# Patient Record
Sex: FEMALE | Race: WHITE | NOT HISPANIC OR LATINO
[De-identification: names, ages, dates, MRNs, and addresses within clinical notes are randomized per-mention and may not be internally consistent; named-entity substitution may affect disease eponyms.]

---

## 2018-08-05 ENCOUNTER — TRANSCRIPTION ENCOUNTER (OUTPATIENT)
Age: 68
End: 2018-08-05

## 2018-08-06 ENCOUNTER — OUTPATIENT (OUTPATIENT)
Dept: OUTPATIENT SERVICES | Facility: HOSPITAL | Age: 68
LOS: 1 days | Discharge: HOME | End: 2018-08-06

## 2018-08-06 VITALS
OXYGEN SATURATION: 99 % | WEIGHT: 171.96 LBS | HEART RATE: 81 BPM | DIASTOLIC BLOOD PRESSURE: 78 MMHG | TEMPERATURE: 100 F | RESPIRATION RATE: 16 BRPM | HEIGHT: 63.25 IN | SYSTOLIC BLOOD PRESSURE: 160 MMHG

## 2018-08-06 VITALS
TEMPERATURE: 98 F | RESPIRATION RATE: 18 BRPM | SYSTOLIC BLOOD PRESSURE: 119 MMHG | HEART RATE: 75 BPM | OXYGEN SATURATION: 97 % | DIASTOLIC BLOOD PRESSURE: 65 MMHG

## 2018-08-06 DIAGNOSIS — T14.8XXA OTHER INJURY OF UNSPECIFIED BODY REGION, INITIAL ENCOUNTER: ICD-10-CM

## 2018-08-06 DIAGNOSIS — I10 ESSENTIAL (PRIMARY) HYPERTENSION: ICD-10-CM

## 2018-08-06 DIAGNOSIS — S62.645A NONDISPLACED FRACTURE OF PROXIMAL PHALANX OF LEFT RING FINGER, INITIAL ENCOUNTER FOR CLOSED FRACTURE: ICD-10-CM

## 2018-08-06 LAB
BUN SERPL-MCNC: 18 MG/DL — SIGNIFICANT CHANGE UP (ref 7–23)
CALCIUM SERPL-MCNC: 9.6 MG/DL — SIGNIFICANT CHANGE UP (ref 8.4–10.5)
CHLORIDE SERPL-SCNC: 104 MMOL/L — SIGNIFICANT CHANGE UP (ref 98–107)
CO2 SERPL-SCNC: 24 MMOL/L — SIGNIFICANT CHANGE UP (ref 22–31)
CREAT SERPL-MCNC: 0.8 MG/DL — SIGNIFICANT CHANGE UP (ref 0.5–1.3)
GLUCOSE SERPL-MCNC: 127 MG/DL — HIGH (ref 70–99)
HCT VFR BLD CALC: 44.9 % — SIGNIFICANT CHANGE UP (ref 34.5–45)
HGB BLD-MCNC: 14.8 G/DL — SIGNIFICANT CHANGE UP (ref 11.5–15.5)
MCHC RBC-ENTMCNC: 30 PG — SIGNIFICANT CHANGE UP (ref 27–34)
MCHC RBC-ENTMCNC: 33 % — SIGNIFICANT CHANGE UP (ref 32–36)
MCV RBC AUTO: 90.9 FL — SIGNIFICANT CHANGE UP (ref 80–100)
NRBC # FLD: 0 — SIGNIFICANT CHANGE UP
PLATELET # BLD AUTO: 244 K/UL — SIGNIFICANT CHANGE UP (ref 150–400)
PMV BLD: 9.7 FL — SIGNIFICANT CHANGE UP (ref 7–13)
POTASSIUM SERPL-MCNC: 4.9 MMOL/L — SIGNIFICANT CHANGE UP (ref 3.5–5.3)
POTASSIUM SERPL-SCNC: 4.9 MMOL/L — SIGNIFICANT CHANGE UP (ref 3.5–5.3)
RBC # BLD: 4.94 M/UL — SIGNIFICANT CHANGE UP (ref 3.8–5.2)
RBC # FLD: 13.2 % — SIGNIFICANT CHANGE UP (ref 10.3–14.5)
SODIUM SERPL-SCNC: 141 MMOL/L — SIGNIFICANT CHANGE UP (ref 135–145)
WBC # BLD: 6.42 K/UL — SIGNIFICANT CHANGE UP (ref 3.8–10.5)
WBC # FLD AUTO: 6.42 K/UL — SIGNIFICANT CHANGE UP (ref 3.8–10.5)

## 2018-08-06 RX ORDER — ASPIRIN/CALCIUM CARB/MAGNESIUM 324 MG
1 TABLET ORAL
Qty: 0 | Refills: 0 | COMMUNITY

## 2018-08-06 RX ORDER — TIMOLOL 0.5 %
1 DROPS OPHTHALMIC (EYE)
Qty: 0 | Refills: 0 | COMMUNITY

## 2018-08-06 RX ORDER — LOSARTAN POTASSIUM 100 MG/1
1 TABLET, FILM COATED ORAL
Qty: 0 | Refills: 0 | COMMUNITY

## 2018-08-06 RX ORDER — ONDANSETRON 8 MG/1
1 TABLET, FILM COATED ORAL
Qty: 30 | Refills: 0 | OUTPATIENT
Start: 2018-08-06

## 2018-08-06 RX ORDER — LATANOPROST 0.05 MG/ML
1 SOLUTION/ DROPS OPHTHALMIC; TOPICAL
Qty: 0 | Refills: 0 | COMMUNITY

## 2018-08-06 RX ORDER — METOPROLOL TARTRATE 50 MG
1 TABLET ORAL
Qty: 0 | Refills: 0 | COMMUNITY

## 2018-08-06 NOTE — H&P PST ADULT - NEGATIVE NEUROLOGICAL SYMPTOMS
no loss of sensation/no difficulty walking/h/o stroke/no transient paralysis/no weakness/no paresthesias

## 2018-08-06 NOTE — H&P PST ADULT - NEGATIVE GASTROINTESTINAL SYMPTOMS
no constipation/no abdominal pain/no nausea/no change in bowel habits/no flatulence/no vomiting/no diarrhea

## 2018-08-06 NOTE — H&P PST ADULT - FAMILY HISTORY
Family history of hypertension     Mother  Still living? No  Family history of glaucoma, Age at diagnosis: Age Unknown

## 2018-08-06 NOTE — H&P PST ADULT - MUSCULOSKELETAL
details… detailed exam no joint swelling/no joint erythema/left ring finger/decreased ROM due to pain

## 2018-08-06 NOTE — H&P PST ADULT - PMH
Anemia  Iron deficiency  Colon Cancer  s/p colon resection and chemotherapy  CVA (Cerebral Vascular Accident)  3/2010 Had TPA, no residual.  Gallstones  h/o  Glaucoma    Hiatal Hernia Anemia  Iron deficiency  Colon Cancer  s/p colon resection and chemotherapy  CVA (Cerebral Vascular Accident)  3/2010 Had TPA, no residual.  Gallstones  h/o  Glaucoma    Hiatal Hernia    Obesity

## 2018-08-06 NOTE — H&P PST ADULT - NEGATIVE CARDIOVASCULAR SYMPTOMS
no dyspnea on exertion/no palpitations/no peripheral edema/no chest pain/no orthopnea/no claudication/no paroxysmal nocturnal dyspnea

## 2018-08-06 NOTE — H&P PST ADULT - NSANTHOSAYNRD_GEN_A_CORE
No. FRANCISCO screening performed.  STOP BANG Legend: 0-2 = LOW Risk; 3-4 = INTERMEDIATE Risk; 5-8 = HIGH Risk

## 2018-08-06 NOTE — ASU DISCHARGE PLAN (ADULT/PEDIATRIC). - NOTIFY
Bleeding that does not stop/Numbness, color, or temperature change to extremity/Swelling that continues/Pain not relieved by Medications/Persistent Nausea and Vomiting/Fever greater than 101/Inability to Tolerate Liquids or Foods

## 2018-08-06 NOTE — ASU DISCHARGE PLAN (ADULT/PEDIATRIC). - MEDICATION SUMMARY - MEDICATIONS TO TAKE
I will START or STAY ON the medications listed below when I get home from the hospital:    aspirin 81 mg oral tablet  -- 1 tab(s) by mouth once a day  last dose 8/5  -- Indication: For home med    Percocet 5/325 oral tablet  -- 1-2 tab(s) by mouth every 4-6 hours, As Needed MDD:6   -- Caution federal law prohibits the transfer of this drug to any person other  than the person for whom it was prescribed.  May cause drowsiness.  Alcohol may intensify this effect.  Use care when operating dangerous machinery.  This prescription cannot be refilled.  This product contains acetaminophen.  Do not use  with any other product containing acetaminophen to prevent possible liver damage.  Using more of this medication than prescribed may cause serious breathing problems.    -- Indication: For pain    losartan 100 mg oral tablet  -- 1 tab(s) by mouth once a day in afternoon  -- Indication: For HTN    Zofran ODT 4 mg oral tablet, disintegrating  -- 1 tab(s) by mouth 3 times a day, As Needed  -- Indication: For nausea    metoprolol succinate 50 mg oral tablet, extended release  -- 1 tab(s) by mouth once a day in pm  -- Indication: For nausea    latanoprost 0.005% ophthalmic solution  -- 1 drop(s) to each affected eye once a day (in the evening) both eyes  -- Indication: For home med    timolol maleate 0.5% ophthalmic solution  -- 1 drop(s) to each affected eye once a day in both eyes  -- Indication: For home med

## 2018-08-06 NOTE — H&P PST ADULT - HISTORY OF PRESENT ILLNESS
none
69 y/o female presents for preop eval for ORIF left ring finger 8/6/2018.  Per pt sustained injury post fall 7/28/18.   Preop dx fracture.

## 2021-04-23 NOTE — H&P PST ADULT - PRO TOBACCO TYPE
Alert-The patient is alert, awake and responds to voice. The patient is oriented to time, place, and person. The triage nurse is able to obtain subjective information.
cigarettes/Quit 12 yrs, poor historian with smoking hx

## 2021-11-13 NOTE — H&P PST ADULT - MUSCULOSKELETAL COMMENTS
Problems reprioritized. Patient report given, questions answered & plan of care reviewed 
with Prudence RN. per pt had head ct done post fall negative result

## 2023-03-02 ENCOUNTER — OFFICE (OUTPATIENT)
Dept: URBAN - METROPOLITAN AREA CLINIC 90 | Facility: CLINIC | Age: 73
Setting detail: OPHTHALMOLOGY
End: 2023-03-02
Payer: MEDICARE

## 2023-03-02 DIAGNOSIS — H02.834: ICD-10-CM

## 2023-03-02 DIAGNOSIS — H40.1131: ICD-10-CM

## 2023-03-02 DIAGNOSIS — Z82.1: ICD-10-CM

## 2023-03-02 DIAGNOSIS — H25.13: ICD-10-CM

## 2023-03-02 DIAGNOSIS — H16.222: ICD-10-CM

## 2023-03-02 DIAGNOSIS — D31.31: ICD-10-CM

## 2023-03-02 DIAGNOSIS — H02.831: ICD-10-CM

## 2023-03-02 PROCEDURE — 92014 COMPRE OPH EXAM EST PT 1/>: CPT | Performed by: OPHTHALMOLOGY

## 2023-03-02 PROCEDURE — 92083 EXTENDED VISUAL FIELD XM: CPT | Performed by: OPHTHALMOLOGY

## 2023-03-02 PROCEDURE — 92133 CPTRZD OPH DX IMG PST SGM ON: CPT | Performed by: OPHTHALMOLOGY

## 2023-03-02 ASSESSMENT — REFRACTION_CURRENTRX
OD_CYLINDER: -2.25
OS_AXIS: 45
OS_CYLINDER: -2.25
OD_SPHERE: +1.00
OS_SPHERE: +0.50
OS_SPHERE: +0.50
OD_ADD: +3.00
OS_SPHERE: +0.75
OD_ADD: +2.75
OD_CYLINDER: -2.25
OS_OVR_VA: 20/
OS_ADD: +2.75
OS_VPRISM_DIRECTION: PROGS
OD_SPHERE: +1.00
OD_AXIS: 112
OD_OVR_VA: 20/
OS_AXIS: 045
OD_AXIS: 109
OS_VPRISM_DIRECTION: PROGS
OD_SPHERE: +1.00
OD_SPHERE: +1.00
OD_VPRISM_DIRECTION: PROGS
OS_CYLINDER: -2.50
OD_ADD: +3.00
OD_VPRISM_DIRECTION: PROGS
OS_AXIS: 047
OS_OVR_VA: 20/
OS_OVR_VA: 20/
OD_OVR_VA: 20/
OD_VPRISM_DIRECTION: PROGS
OS_CYLINDER: -2.50
OS_ADD: +3.00
OD_OVR_VA: 20/
OS_VPRISM_DIRECTION: PROGS
OS_SPHERE: +0.50
OD_ADD: +2.50
OD_CYLINDER: -2.25
OD_CYLINDER: -2.25
OS_CYLINDER: -2.50
OD_AXIS: 117
OS_AXIS: 047
OS_VPRISM_DIRECTION: PROGS
OS_ADD: +2.50
OD_VPRISM_DIRECTION: PROGS
OS_ADD: +3.00
OD_AXIS: 112

## 2023-03-02 ASSESSMENT — REFRACTION_AUTOREFRACTION
OD_CYLINDER: +-1.75
OS_CYLINDER: -2.50
OS_SPHERE: +1.50
OS_AXIS: 058
OD_AXIS: 124
OD_SPHERE: +1.75

## 2023-03-02 ASSESSMENT — REFRACTION_MANIFEST
OD_ADD: +2.75
OS_SPHERE: +0.50
OS_AXIS: 45
OD_ADD: +3.00
OS_AXIS: 45
OD_SPHERE: +1.00
OD_CYLINDER: -2.25
OD_AXIS: 112
OD_CYLINDER: -2.25
OS_SPHERE: +0.50
OS_CYLINDER: -2.50
OD_AXIS: 112
OD_SPHERE: +1.00
OS_ADD: +3.00
OS_ADD: +3.00
OS_CYLINDER: -2.50

## 2023-03-02 ASSESSMENT — LID EXAM ASSESSMENTS
OS_COMMENTS: LONG LASHES
OD_COMMENTS: LONG LASHES

## 2023-03-02 ASSESSMENT — SPHEQUIV_DERIVED
OS_SPHEQUIV: -0.75
OD_SPHEQUIV: -0.125
OS_SPHEQUIV: 0.25
OS_SPHEQUIV: -0.75
OD_SPHEQUIV: -0.125

## 2023-03-02 ASSESSMENT — AXIALLENGTH_DERIVED
OD_AL: 23.2775
OS_AL: 23.336
OS_AL: 23.336
OS_AL: 22.9609
OD_AL: 23.2775

## 2023-03-02 ASSESSMENT — CONFRONTATIONAL VISUAL FIELD TEST (CVF)
OD_FINDINGS: FULL
OS_FINDINGS: FULL

## 2023-03-02 ASSESSMENT — PACHYMETRY
OD_CT_CORRECTION: 1
OS_CT_UM: 527
OS_CT_CORRECTION: 1
OD_CT_UM: 522

## 2023-03-02 ASSESSMENT — KERATOMETRY
OS_AXISANGLE_DEGREES: 136
OS_K1POWER_DIOPTERS: 44.25
OS_K2POWER_DIOPTERS: 45.75
OD_AXISANGLE_DEGREES: 041
OD_K1POWER_DIOPTERS: 44.00
OD_K2POWER_DIOPTERS: 45.00
METHOD_AUTO_MANUAL: AUTO

## 2023-03-02 ASSESSMENT — LID POSITION - DERMATOCHALASIS
OS_DERMATOCHALASIS: LUL 1+
OD_DERMATOCHALASIS: RUL 1+

## 2023-03-02 ASSESSMENT — VISUAL ACUITY
OS_BCVA: 20/25
OD_BCVA: 20/25

## 2023-03-02 ASSESSMENT — TONOMETRY
OS_IOP_MMHG: 18
OD_IOP_MMHG: 16

## 2025-03-06 ENCOUNTER — OFFICE (OUTPATIENT)
Facility: LOCATION | Age: 75
Setting detail: OPHTHALMOLOGY
End: 2025-03-06
Payer: MEDICARE

## 2025-03-06 DIAGNOSIS — H16.223: ICD-10-CM

## 2025-03-06 DIAGNOSIS — H02.834: ICD-10-CM

## 2025-03-06 DIAGNOSIS — H40.1131: ICD-10-CM

## 2025-03-06 DIAGNOSIS — H25.13: ICD-10-CM

## 2025-03-06 DIAGNOSIS — H02.831: ICD-10-CM

## 2025-03-06 PROCEDURE — 92014 COMPRE OPH EXAM EST PT 1/>: CPT | Performed by: OPHTHALMOLOGY

## 2025-03-06 ASSESSMENT — REFRACTION_CURRENTRX
OD_VPRISM_DIRECTION: PROGS
OS_AXIS: 052
OS_SPHERE: +0.50
OS_OVR_VA: 20/
OD_ADD: +3.00
OD_AXIS: 117
OD_AXIS: 112
OD_ADD: +2.50
OD_ADD: +3.00
OS_AXIS: 045
OS_SPHERE: +0.50
OD_OVR_VA: 20/
OD_CYLINDER: -2.25
OD_SPHERE: +1.00
OD_ADD: +2.25
OS_CYLINDER: -1.75
OD_ADD: +2.75
OS_CYLINDER: -2.50
OD_CYLINDER: -2.25
OS_AXIS: 45
OD_VPRISM_DIRECTION: PROGS
OS_CYLINDER: -2.50
OS_CYLINDER: -2.50
OS_VPRISM_DIRECTION: PROGS
OS_OVR_VA: 20/
OS_VPRISM_DIRECTION: PROGS
OS_SPHERE: +0.50
OS_SPHERE: +0.50
OS_ADD: +2.75
OD_VPRISM_DIRECTION: PROGS
OS_AXIS: 047
OS_VPRISM_DIRECTION: PROGS
OD_CYLINDER: -2.25
OD_SPHERE: +1.00
OD_SPHERE: +1.00
OS_ADD: +3.00
OS_CYLINDER: -2.25
OD_CYLINDER: -2.25
OD_SPHERE: +1.00
OD_CYLINDER: -2.25
OS_ADD: +3.00
OD_AXIS: 109
OS_AXIS: 047
OD_OVR_VA: 20/
OS_ADD: +2.25
OS_OVR_VA: 20/
OS_VPRISM_DIRECTION: PROGS
OD_AXIS: 112
OD_VPRISM_DIRECTION: PROGS
OD_SPHERE: +1.00
OS_SPHERE: +0.75
OD_OVR_VA: 20/
OS_ADD: +2.50
OD_AXIS: 109

## 2025-03-06 ASSESSMENT — LID POSITION - DERMATOCHALASIS
OD_DERMATOCHALASIS: RUL 1+
OS_DERMATOCHALASIS: LUL 1+

## 2025-03-06 ASSESSMENT — PACHYMETRY
OS_CT_UM: 527
OD_CT_CORRECTION: 1
OD_CT_UM: 522
OS_CT_CORRECTION: 1

## 2025-03-06 ASSESSMENT — KERATOMETRY
OS_K2POWER_DIOPTERS: 45.50
OD_K2POWER_DIOPTERS: 44.50
OD_AXISANGLE_DEGREES: 034
OS_AXISANGLE_DEGREES: 136
METHOD_AUTO_MANUAL: AUTO
OD_K1POWER_DIOPTERS: 42.75
OS_K1POWER_DIOPTERS: 43.25

## 2025-03-06 ASSESSMENT — TONOMETRY
OS_IOP_MMHG: 19
OD_IOP_MMHG: 18
OD_IOP_MMHG: 19
OS_IOP_MMHG: 19

## 2025-03-06 ASSESSMENT — REFRACTION_MANIFEST
OS_CYLINDER: -2.50
OS_AXIS: 45
OD_SPHERE: +1.00
OS_SPHERE: +0.50
OD_CYLINDER: -2.25
OS_AXIS: 45
OD_ADD: +2.75
OS_ADD: +3.00
OS_SPHERE: +0.50
OD_SPHERE: +1.00
OS_AXIS: 45
OD_AXIS: 112
OD_SPHERE: +1.00
OS_ADD: +3.00
OD_ADD: +3.00
OS_CYLINDER: -2.50
OS_ADD: +3.00
OD_AXIS: 112
OS_CYLINDER: -2.50
OD_CYLINDER: -2.25
OD_CYLINDER: -2.25
OS_SPHERE: +0.50
OD_ADD: +3.00
OD_AXIS: 112

## 2025-03-06 ASSESSMENT — TEAR BREAK UP TIME (TBUT)
OS_TBUT: 1+
OD_TBUT: 1+

## 2025-03-06 ASSESSMENT — CONFRONTATIONAL VISUAL FIELD TEST (CVF)
OS_FINDINGS: FULL
OD_FINDINGS: FULL

## 2025-03-06 ASSESSMENT — VISUAL ACUITY
OD_BCVA: 20/25-3
OS_BCVA: 20/30-

## 2025-03-06 ASSESSMENT — REFRACTION_AUTOREFRACTION
OD_SPHERE: +2.25
OS_AXIS: 053
OD_AXIS: 123
OS_SPHERE: +1.75
OS_CYLINDER: -2.75
OD_CYLINDER: -2.25

## 2025-03-06 ASSESSMENT — LID EXAM ASSESSMENTS
OD_COMMENTS: LONG LASHES
OS_COMMENTS: LONG LASHES